# Patient Record
Sex: FEMALE | Race: WHITE | NOT HISPANIC OR LATINO
[De-identification: names, ages, dates, MRNs, and addresses within clinical notes are randomized per-mention and may not be internally consistent; named-entity substitution may affect disease eponyms.]

---

## 2018-05-23 PROBLEM — Z00.00 ENCOUNTER FOR PREVENTIVE HEALTH EXAMINATION: Status: ACTIVE | Noted: 2018-05-23

## 2018-05-30 ENCOUNTER — APPOINTMENT (OUTPATIENT)
Dept: INTERNAL MEDICINE | Facility: CLINIC | Age: 68
End: 2018-05-30
Payer: MEDICARE

## 2018-05-30 VITALS — RESPIRATION RATE: 12 BRPM | HEART RATE: 74 BPM | SYSTOLIC BLOOD PRESSURE: 142 MMHG | DIASTOLIC BLOOD PRESSURE: 92 MMHG

## 2018-05-30 VITALS — WEIGHT: 161 LBS

## 2018-05-30 VITALS — DIASTOLIC BLOOD PRESSURE: 90 MMHG | SYSTOLIC BLOOD PRESSURE: 144 MMHG

## 2018-05-30 DIAGNOSIS — Z78.9 OTHER SPECIFIED HEALTH STATUS: ICD-10-CM

## 2018-05-30 DIAGNOSIS — Z87.42 PERSONAL HISTORY OF OTHER DISEASES OF THE FEMALE GENITAL TRACT: ICD-10-CM

## 2018-05-30 DIAGNOSIS — H33.313 HORSESHOE TEAR OF RETINA W/OUT DETACHMENT, BILATERAL: ICD-10-CM

## 2018-05-30 DIAGNOSIS — Z78.0 ASYMPTOMATIC MENOPAUSAL STATE: ICD-10-CM

## 2018-05-30 DIAGNOSIS — J31.0 CHRONIC RHINITIS: ICD-10-CM

## 2018-05-30 DIAGNOSIS — E78.00 PURE HYPERCHOLESTEROLEMIA, UNSPECIFIED: ICD-10-CM

## 2018-05-30 PROCEDURE — 99204 OFFICE O/P NEW MOD 45 MIN: CPT

## 2018-05-30 RX ORDER — ERGOCALCIFEROL (VITAMIN D2) 1250 MCG
50000 CAPSULE ORAL
Refills: 0 | Status: ACTIVE | COMMUNITY

## 2018-05-30 RX ORDER — ATORVASTATIN CALCIUM 20 MG/1
20 TABLET, FILM COATED ORAL
Refills: 0 | Status: ACTIVE | COMMUNITY

## 2018-05-30 RX ORDER — TOCOPHERSOLAN (VITAMIN E TPGS) 400/15ML
LIQUID (ML) ORAL
Refills: 0 | Status: ACTIVE | COMMUNITY

## 2018-05-30 RX ORDER — IBUPROFEN 200 MG/1
200 TABLET, COATED ORAL
Refills: 0 | Status: ACTIVE | COMMUNITY

## 2018-05-30 RX ORDER — PSEUDOEPHEDRINE HCL 30 MG
TABLET ORAL
Refills: 0 | Status: ACTIVE | COMMUNITY

## 2018-05-30 RX ORDER — CHLORHEXIDINE GLUCONATE 4 %
1000 LIQUID (ML) TOPICAL
Refills: 0 | Status: ACTIVE | COMMUNITY

## 2018-05-30 NOTE — HEALTH RISK ASSESSMENT
[Good] : ~his/her~  mood as  good [No falls in past year] : Patient reported no falls in the past year [0] : 2) Feeling down, depressed, or hopeless: Not at all (0) [] : No [[id number]] : 0

## 2018-05-30 NOTE — ASSESSMENT
[FreeTextEntry1] : Hyperchol\par Remote head injury or perhaps scalp injury at work that did not cause a concussion but pt filed a WC claim just in case of future head problems.  \par Bilat breast lumpectomies for benign lesions\par Midline surg for endometriosis\par Lap GB\par Rhinitis.  USes Sudafed prn.  Advise caution with BP\par HTN. Not on BP meds.\par

## 2018-05-30 NOTE — REVIEW OF SYSTEMS
[Negative] : Heme/Lymph [de-identified] : Remote head injury without concussion that she filed a WC claim on

## 2018-05-30 NOTE — PHYSICAL EXAM

## 2023-06-19 ENCOUNTER — APPOINTMENT (OUTPATIENT)
Dept: ORTHOPEDIC SURGERY | Facility: CLINIC | Age: 73
End: 2023-06-19
Payer: MEDICARE

## 2023-06-19 VITALS — HEIGHT: 67 IN | WEIGHT: 165 LBS | BODY MASS INDEX: 25.9 KG/M2

## 2023-06-19 DIAGNOSIS — S30.0XXA CONTUSION OF LOWER BACK AND PELVIS, INITIAL ENCOUNTER: ICD-10-CM

## 2023-06-19 PROCEDURE — 72170 X-RAY EXAM OF PELVIS: CPT

## 2023-06-19 PROCEDURE — 72100 X-RAY EXAM L-S SPINE 2/3 VWS: CPT

## 2023-06-19 PROCEDURE — 99203 OFFICE O/P NEW LOW 30 MIN: CPT

## 2023-06-19 RX ORDER — MELOXICAM 15 MG/1
15 TABLET ORAL
Qty: 15 | Refills: 0 | Status: ACTIVE | COMMUNITY
Start: 2023-06-19 | End: 1900-01-01

## 2023-06-19 NOTE — REASON FOR VISIT
[Family Member] : family member [FreeTextEntry2] : This is a 73 year old F with acute right sided back pain after she fell down some stairs today around 9 am.  She is moving, and has been lifting and carrying today with discomfort.  No numbness.  Going up stairs is sore.  She tried Advil and ice.  Her sister is here.  No prior LS issues.

## 2023-06-19 NOTE — ASSESSMENT
[FreeTextEntry1] : We reviewed the findings and the history.\par Questions were answered and concerned addressed.\par The options were outlined.\par We discussed feeling worse before she feels better.\par No obvious fracture/slip on XR today.\par Mobic rx.\par Moist heat, ice, stretching.\par Will see Dr. More for re evaluation in a few weeks.\par \par Progress note completed by Tory Reynolds PAC.\par Patient seen by Tory Reynolds PAC.

## 2023-06-19 NOTE — IMAGING
[Disc space narrowing] : Disc space narrowing [There are no fractures, subluxations or dislocations. No significant abnormalities are seen] : There are no fractures, subluxations or dislocations. No significant abnormalities are seen

## 2023-06-19 NOTE — HISTORY OF PRESENT ILLNESS
[10] : 10 [9] : 9 [FreeTextEntry1] : low back  [FreeTextEntry5] : pt fell  in the stairs today, pt states she hit her back on the stairs

## 2023-06-19 NOTE — PHYSICAL EXAM
[Flexion] : flexion [Extension] : extension [] : patient ambulates without assistive device [TWNoteComboBox7] : forward flexion 45 degrees [de-identified] : extension 10 degrees [de-identified] : left lateral bending 20 degrees [de-identified] : left lateral rotation 25 degrees [de-identified] : right lateral bending 20 degrees [TWNoteComboBox6] : right lateral rotation 25 degrees

## 2023-07-06 ENCOUNTER — APPOINTMENT (OUTPATIENT)
Dept: ORTHOPEDIC SURGERY | Facility: CLINIC | Age: 73
End: 2023-07-06

## 2024-12-10 ENCOUNTER — NON-APPOINTMENT (OUTPATIENT)
Age: 74
End: 2024-12-10

## 2025-01-16 ENCOUNTER — NON-APPOINTMENT (OUTPATIENT)
Age: 75
End: 2025-01-16

## 2025-06-10 ENCOUNTER — NON-APPOINTMENT (OUTPATIENT)
Age: 75
End: 2025-06-10